# Patient Record
Sex: MALE | Race: WHITE | URBAN - METROPOLITAN AREA
[De-identification: names, ages, dates, MRNs, and addresses within clinical notes are randomized per-mention and may not be internally consistent; named-entity substitution may affect disease eponyms.]

---

## 2024-01-24 DIAGNOSIS — E78.5 HYPERLIPIDEMIA, UNSPECIFIED HYPERLIPIDEMIA TYPE: ICD-10-CM

## 2024-01-24 RX ORDER — LISINOPRIL 10 MG/1
10 TABLET ORAL DAILY
COMMUNITY
End: 2024-01-24 | Stop reason: SDUPTHER

## 2024-01-24 RX ORDER — LISINOPRIL 10 MG/1
10 TABLET ORAL DAILY
Qty: 90 TABLET | Refills: 3 | Status: SHIPPED | OUTPATIENT
Start: 2024-01-24

## 2024-02-08 ENCOUNTER — OFFICE (OUTPATIENT)
Dept: URBAN - METROPOLITAN AREA CLINIC 26 | Facility: CLINIC | Age: 79
End: 2024-02-08

## 2024-02-08 VITALS
HEART RATE: 73 BPM | DIASTOLIC BLOOD PRESSURE: 70 MMHG | HEIGHT: 73 IN | SYSTOLIC BLOOD PRESSURE: 132 MMHG | TEMPERATURE: 98.5 F | WEIGHT: 168 LBS

## 2024-02-08 DIAGNOSIS — Z09 ENCOUNTER FOR FOLLOW-UP EXAMINATION AFTER COMPLETED TREATMEN: ICD-10-CM

## 2024-02-08 DIAGNOSIS — Z86.010 PERSONAL HISTORY OF COLONIC POLYPS: ICD-10-CM

## 2024-02-08 DIAGNOSIS — K92.1 MELENA: ICD-10-CM

## 2024-02-08 DIAGNOSIS — Z80.0 FAMILY HISTORY OF MALIGNANT NEOPLASM OF DIGESTIVE ORGANS: ICD-10-CM

## 2024-02-08 PROCEDURE — 99204 OFFICE O/P NEW MOD 45 MIN: CPT | Performed by: INTERNAL MEDICINE

## 2024-02-22 PROBLEM — E11.9 DIABETES MELLITUS (MULTI): Status: ACTIVE | Noted: 2024-02-22

## 2024-02-22 PROBLEM — I63.9 STROKE (MULTI): Status: ACTIVE | Noted: 2024-02-22

## 2024-02-22 PROBLEM — Z95.5 STATUS POST INSERTION OF DRUG-ELUTING STENT INTO LEFT ANTERIOR DESCENDING (LAD) ARTERY: Status: ACTIVE | Noted: 2024-02-22

## 2024-02-22 PROBLEM — I10 BENIGN HYPERTENSION: Status: ACTIVE | Noted: 2024-02-22

## 2024-02-22 PROBLEM — M54.2 NECK PAIN, BILATERAL: Status: ACTIVE | Noted: 2024-02-22

## 2024-02-22 PROBLEM — I25.10 CAD (CORONARY ARTERY DISEASE): Status: ACTIVE | Noted: 2024-02-22

## 2024-02-22 PROBLEM — I25.2 OLD ANTERIOR MYOCARDIAL INFARCTION: Status: ACTIVE | Noted: 2024-02-22

## 2024-02-22 PROBLEM — E78.5 DYSLIPIDEMIA: Status: ACTIVE | Noted: 2024-02-22

## 2024-02-22 PROBLEM — I25.5 ISCHEMIC CARDIOMYOPATHY: Status: ACTIVE | Noted: 2024-02-22

## 2024-02-28 ENCOUNTER — OFFICE VISIT (OUTPATIENT)
Dept: CARDIOLOGY | Facility: CLINIC | Age: 79
End: 2024-02-28
Payer: MEDICARE

## 2024-02-28 VITALS
WEIGHT: 167 LBS | HEIGHT: 73 IN | SYSTOLIC BLOOD PRESSURE: 138 MMHG | HEART RATE: 71 BPM | DIASTOLIC BLOOD PRESSURE: 72 MMHG | BODY MASS INDEX: 22.13 KG/M2 | OXYGEN SATURATION: 98 %

## 2024-02-28 DIAGNOSIS — I25.2 OLD ANTERIOR MYOCARDIAL INFARCTION: ICD-10-CM

## 2024-02-28 DIAGNOSIS — I25.10 CORONARY ARTERY DISEASE INVOLVING NATIVE HEART, UNSPECIFIED VESSEL OR LESION TYPE, UNSPECIFIED WHETHER ANGINA PRESENT: Primary | ICD-10-CM

## 2024-02-28 DIAGNOSIS — I25.5 ISCHEMIC CARDIOMYOPATHY: ICD-10-CM

## 2024-02-28 DIAGNOSIS — E78.5 DYSLIPIDEMIA: ICD-10-CM

## 2024-02-28 DIAGNOSIS — Z95.5 STATUS POST INSERTION OF DRUG-ELUTING STENT INTO LEFT ANTERIOR DESCENDING (LAD) ARTERY: ICD-10-CM

## 2024-02-28 DIAGNOSIS — I10 BENIGN HYPERTENSION: ICD-10-CM

## 2024-02-28 PROCEDURE — 3075F SYST BP GE 130 - 139MM HG: CPT | Performed by: STUDENT IN AN ORGANIZED HEALTH CARE EDUCATION/TRAINING PROGRAM

## 2024-02-28 PROCEDURE — 1159F MED LIST DOCD IN RCRD: CPT | Performed by: STUDENT IN AN ORGANIZED HEALTH CARE EDUCATION/TRAINING PROGRAM

## 2024-02-28 PROCEDURE — 1036F TOBACCO NON-USER: CPT | Performed by: STUDENT IN AN ORGANIZED HEALTH CARE EDUCATION/TRAINING PROGRAM

## 2024-02-28 PROCEDURE — 1126F AMNT PAIN NOTED NONE PRSNT: CPT | Performed by: STUDENT IN AN ORGANIZED HEALTH CARE EDUCATION/TRAINING PROGRAM

## 2024-02-28 PROCEDURE — 3078F DIAST BP <80 MM HG: CPT | Performed by: STUDENT IN AN ORGANIZED HEALTH CARE EDUCATION/TRAINING PROGRAM

## 2024-02-28 PROCEDURE — 99213 OFFICE O/P EST LOW 20 MIN: CPT | Performed by: STUDENT IN AN ORGANIZED HEALTH CARE EDUCATION/TRAINING PROGRAM

## 2024-02-28 PROCEDURE — 93000 ELECTROCARDIOGRAM COMPLETE: CPT | Performed by: STUDENT IN AN ORGANIZED HEALTH CARE EDUCATION/TRAINING PROGRAM

## 2024-02-28 RX ORDER — TAMSULOSIN HYDROCHLORIDE 0.4 MG/1
0.4 CAPSULE ORAL DAILY
COMMUNITY

## 2024-02-28 RX ORDER — DULAGLUTIDE 1.5 MG/.5ML
1.5 INJECTION, SOLUTION SUBCUTANEOUS
COMMUNITY
Start: 2018-06-08

## 2024-02-28 RX ORDER — METOPROLOL SUCCINATE 25 MG/1
1 TABLET, EXTENDED RELEASE ORAL DAILY
COMMUNITY
End: 2024-04-12 | Stop reason: SDUPTHER

## 2024-02-28 RX ORDER — CLOPIDOGREL BISULFATE 75 MG/1
1 TABLET ORAL DAILY
COMMUNITY
End: 2024-04-12 | Stop reason: SDUPTHER

## 2024-02-28 RX ORDER — HYDROCODONE BITARTRATE AND ACETAMINOPHEN 5; 325 MG/1; MG/1
1 TABLET ORAL EVERY 4 HOURS PRN
COMMUNITY
Start: 2022-10-19

## 2024-02-28 RX ORDER — PANTOPRAZOLE SODIUM 40 MG/1
1 TABLET, DELAYED RELEASE ORAL DAILY
COMMUNITY

## 2024-02-28 RX ORDER — PRAVASTATIN SODIUM 20 MG/1
1 TABLET ORAL DAILY
COMMUNITY
Start: 2018-04-20

## 2024-02-28 RX ORDER — GLIMEPIRIDE 4 MG/1
4 TABLET ORAL 2 TIMES DAILY
COMMUNITY
Start: 2018-06-08

## 2024-02-28 RX ORDER — FINASTERIDE 5 MG/1
5 TABLET, FILM COATED ORAL DAILY
COMMUNITY
Start: 2023-12-21

## 2024-02-28 RX ORDER — NITROGLYCERIN 0.4 MG/1
0.4 TABLET SUBLINGUAL EVERY 5 MIN PRN
COMMUNITY
Start: 2022-08-10

## 2024-02-28 RX ORDER — METFORMIN HYDROCHLORIDE 1000 MG/1
1000 TABLET ORAL 2 TIMES DAILY
COMMUNITY
Start: 2018-06-08

## 2024-02-28 RX ORDER — ASPIRIN 81 MG/1
1 TABLET ORAL DAILY
COMMUNITY

## 2024-02-28 RX ORDER — POTASSIUM CITRATE 10 MEQ/1
10 TABLET, EXTENDED RELEASE ORAL
COMMUNITY
Start: 2024-02-06

## 2024-02-28 ASSESSMENT — PAIN SCALES - GENERAL: PAINLEVEL: 0-NO PAIN

## 2024-02-28 NOTE — PROGRESS NOTES
"Chief Complaint:   Yearly f/u with EKG today     History Of Present Illness:    Phill Baca is a 79 y.o. male returns for follow-up appointment.  Patient is doing well since his last appointment.  He has had changes in his stools he has a colonoscopy planned.  Otherwise has not had chest pain shortness of breath palpitations lower extreme edema orthopnea or PND.  Functional at baseline.  Baseline EKG shows sinus rhythm with PVCs.  Blood pressures acceptable 138/72 and heart rate of 71.     Last Recorded Vitals:  Vitals:    02/28/24 1505   BP: 138/72   BP Location: Right arm   Patient Position: Sitting   BP Cuff Size: Adult   Pulse: 71   SpO2: 98%   Weight: 75.8 kg (167 lb)   Height: 1.854 m (6' 1\")       Review of Systems  ROS      Allergies:  Allopurinol and Adhesive tape-silicones    Outpatient Medications:  Current Outpatient Medications   Medication Instructions    aspirin 81 mg EC tablet 1 tablet, oral, Daily    clopidogrel (Plavix) 75 mg tablet 1 tablet, oral, Daily    finasteride (PROSCAR) 5 mg, oral, Daily    glimepiride (AMARYL) 4 mg, oral, 2 times daily    HYDROcodone-acetaminophen (Norco) 5-325 mg tablet 1 tablet, oral, Every 4 hours PRN    lisinopril 10 mg, oral, Daily    metFORMIN (GLUCOPHAGE) 1,000 mg, oral, 2 times daily    metoprolol succinate XL (Toprol-XL) 25 mg 24 hr tablet 1 tablet, oral, Daily    nitroglycerin (NITROSTAT) 0.4 mg, sublingual, Every 5 min PRN    pantoprazole (ProtoNix) 40 mg EC tablet 1 tablet, oral, Daily    potassium citrate CR (Urocit-K-10) 10 mEq ER tablet 10 mEq, oral, 2 times daily with meals    pravastatin (Pravachol) 20 mg tablet 1 tablet, oral, Daily    tamsulosin (FLOMAX) 0.4 mg, oral, Daily    Trulicity 1.5 mg, subcutaneous, Weekly       Physical Exam:  General: No acute distress,  A&O x3, mild frailty  Skin: Warm and dry  Neck: JVD is not elevated  ENT: Moist mucous membranes no lesions appreciated  Pulmonary: CTAB  Cards: Regular rate rhythm, no murmurs gallops " "or rubs appreciated normal S1-S2  Abdomen: Soft nontender nondistended  Extremities: No edema or cyanosis  Psych: Appropriate mood and affect        Last Labs:  CBC -  Lab Results   Component Value Date    WBC 4.9 10/19/2022    HGB 9.3 (L) 10/19/2022    HCT 28.4 (L) 10/19/2022    MCV 82 10/19/2022     (L) 10/19/2022       CMP -  Lab Results   Component Value Date    CALCIUM 7.9 (L) 10/19/2022    PHOS 2.7 10/18/2022    PROT 5.2 (L) 10/19/2022    ALBUMIN 2.8 (L) 10/19/2022    AST 19 10/19/2022    ALT 10 10/19/2022    ALKPHOS 61 10/19/2022    BILITOT 0.5 10/19/2022       LIPID PANEL -   Lab Results   Component Value Date    CHOL 117 10/11/2018    TRIG 200 (H) 10/11/2018    HDL 39.3 (A) 10/11/2018    CHHDL 3.0 10/11/2018    LDLF 38 10/11/2018    VLDL 40 10/11/2018    NHDL 78 10/11/2018       RENAL FUNCTION PANEL -   Lab Results   Component Value Date    GLUCOSE 166 (H) 10/19/2022     10/19/2022    K 4.2 10/19/2022     10/19/2022    CO2 27 10/19/2022    ANIONGAP 11 10/19/2022    BUN 19 10/19/2022    CREATININE 1.15 10/19/2022    GFRMALE 65 10/19/2022    CALCIUM 7.9 (L) 10/19/2022    PHOS 2.7 10/18/2022    ALBUMIN 2.8 (L) 10/19/2022        Lab Results   Component Value Date    HGBA1C 7.4 10/11/2018       Last Cardiology Tests:  ECG:  No results found for this or any previous visit (from the past 4464 hour(s)).     Echo:  No results found for this or any previous visit from the past 1095 days.       Ejection Fractions:  No results found for: \"EF\"    Assessment and Plan    1. Ischemic heart disease: Status post anterior MI in 1998 status post bare-metal stent to the mid LAD and drug-eluting stent in 2015 for restenosis. EF is 50%. Asymptomatic. Currently on lisinopril 10 mg and metoprolol 25 mg daily. Currently on DAPT as well as statin and beta-blocker therapy. As needed nitroglycerin.    Undergoing colonoscopy for change in stools.  If notable pathology appreciated then will consider transitioning to " single antiplatelet therapy.     Baseline ECG notable for sinus rhythm with PVCs, left axis deviation and Q waves in inferior and anterolateral leads.     2. Hypertension: At goal today. Continue current medication regiment.     3. Hyperlipidemia: He is on pravastatin for lipid-lowering agents. His cholesterol was last checked by PCP.      (This note was generated with voice recognition software and may contain errors including spelling, grammar, syntax and missed recognition of what was dictated, of which may not have been fully corrected)     Wilbert Arauz MD PhD

## 2024-04-01 VITALS
HEART RATE: 78 BPM | DIASTOLIC BLOOD PRESSURE: 54 MMHG | SYSTOLIC BLOOD PRESSURE: 112 MMHG | DIASTOLIC BLOOD PRESSURE: 54 MMHG | HEART RATE: 71 BPM | OXYGEN SATURATION: 98 % | RESPIRATION RATE: 17 BRPM | SYSTOLIC BLOOD PRESSURE: 112 MMHG | HEART RATE: 63 BPM | WEIGHT: 169 LBS | DIASTOLIC BLOOD PRESSURE: 47 MMHG | SYSTOLIC BLOOD PRESSURE: 108 MMHG | DIASTOLIC BLOOD PRESSURE: 74 MMHG | SYSTOLIC BLOOD PRESSURE: 109 MMHG | SYSTOLIC BLOOD PRESSURE: 114 MMHG | SYSTOLIC BLOOD PRESSURE: 97 MMHG | SYSTOLIC BLOOD PRESSURE: 119 MMHG | HEART RATE: 75 BPM | SYSTOLIC BLOOD PRESSURE: 149 MMHG | DIASTOLIC BLOOD PRESSURE: 51 MMHG | SYSTOLIC BLOOD PRESSURE: 132 MMHG | RESPIRATION RATE: 15 BRPM | HEART RATE: 71 BPM | SYSTOLIC BLOOD PRESSURE: 124 MMHG | RESPIRATION RATE: 18 BRPM | DIASTOLIC BLOOD PRESSURE: 53 MMHG | SYSTOLIC BLOOD PRESSURE: 124 MMHG | HEART RATE: 75 BPM | RESPIRATION RATE: 19 BRPM | RESPIRATION RATE: 14 BRPM | TEMPERATURE: 97.2 F | HEART RATE: 83 BPM | HEART RATE: 67 BPM | DIASTOLIC BLOOD PRESSURE: 49 MMHG | HEIGHT: 73 IN | SYSTOLIC BLOOD PRESSURE: 121 MMHG | DIASTOLIC BLOOD PRESSURE: 48 MMHG | RESPIRATION RATE: 15 BRPM | RESPIRATION RATE: 12 BRPM | SYSTOLIC BLOOD PRESSURE: 109 MMHG | RESPIRATION RATE: 16 BRPM | SYSTOLIC BLOOD PRESSURE: 108 MMHG | RESPIRATION RATE: 9 BRPM | HEART RATE: 67 BPM | HEART RATE: 63 BPM | HEART RATE: 83 BPM | DIASTOLIC BLOOD PRESSURE: 64 MMHG | OXYGEN SATURATION: 98 % | WEIGHT: 169 LBS | HEART RATE: 68 BPM | OXYGEN SATURATION: 95 % | HEART RATE: 76 BPM | DIASTOLIC BLOOD PRESSURE: 64 MMHG | HEART RATE: 70 BPM | HEART RATE: 76 BPM | SYSTOLIC BLOOD PRESSURE: 97 MMHG | HEART RATE: 60 BPM | RESPIRATION RATE: 14 BRPM | DIASTOLIC BLOOD PRESSURE: 55 MMHG | HEART RATE: 63 BPM | DIASTOLIC BLOOD PRESSURE: 45 MMHG | SYSTOLIC BLOOD PRESSURE: 121 MMHG | RESPIRATION RATE: 16 BRPM | SYSTOLIC BLOOD PRESSURE: 109 MMHG | DIASTOLIC BLOOD PRESSURE: 47 MMHG | SYSTOLIC BLOOD PRESSURE: 120 MMHG | SYSTOLIC BLOOD PRESSURE: 149 MMHG | RESPIRATION RATE: 10 BRPM | RESPIRATION RATE: 17 BRPM | HEART RATE: 68 BPM | RESPIRATION RATE: 17 BRPM | SYSTOLIC BLOOD PRESSURE: 119 MMHG | RESPIRATION RATE: 14 BRPM | HEART RATE: 67 BPM | HEART RATE: 60 BPM | SYSTOLIC BLOOD PRESSURE: 124 MMHG | OXYGEN SATURATION: 95 % | HEIGHT: 73 IN | DIASTOLIC BLOOD PRESSURE: 55 MMHG | SYSTOLIC BLOOD PRESSURE: 119 MMHG | OXYGEN SATURATION: 96 % | DIASTOLIC BLOOD PRESSURE: 47 MMHG | SYSTOLIC BLOOD PRESSURE: 115 MMHG | HEART RATE: 74 BPM | HEART RATE: 76 BPM | SYSTOLIC BLOOD PRESSURE: 115 MMHG | OXYGEN SATURATION: 98 % | SYSTOLIC BLOOD PRESSURE: 123 MMHG | SYSTOLIC BLOOD PRESSURE: 120 MMHG | SYSTOLIC BLOOD PRESSURE: 108 MMHG | HEART RATE: 74 BPM | RESPIRATION RATE: 15 BRPM | RESPIRATION RATE: 9 BRPM | SYSTOLIC BLOOD PRESSURE: 112 MMHG | OXYGEN SATURATION: 97 % | OXYGEN SATURATION: 96 % | WEIGHT: 169 LBS | TEMPERATURE: 97.2 F | DIASTOLIC BLOOD PRESSURE: 51 MMHG | OXYGEN SATURATION: 95 % | OXYGEN SATURATION: 97 % | RESPIRATION RATE: 16 BRPM | DIASTOLIC BLOOD PRESSURE: 51 MMHG | SYSTOLIC BLOOD PRESSURE: 121 MMHG | SYSTOLIC BLOOD PRESSURE: 120 MMHG | DIASTOLIC BLOOD PRESSURE: 49 MMHG | DIASTOLIC BLOOD PRESSURE: 45 MMHG | SYSTOLIC BLOOD PRESSURE: 123 MMHG | SYSTOLIC BLOOD PRESSURE: 111 MMHG | HEART RATE: 75 BPM | HEART RATE: 70 BPM | HEART RATE: 60 BPM | SYSTOLIC BLOOD PRESSURE: 123 MMHG | DIASTOLIC BLOOD PRESSURE: 49 MMHG | SYSTOLIC BLOOD PRESSURE: 114 MMHG | HEART RATE: 78 BPM | RESPIRATION RATE: 10 BRPM | DIASTOLIC BLOOD PRESSURE: 48 MMHG | SYSTOLIC BLOOD PRESSURE: 115 MMHG | SYSTOLIC BLOOD PRESSURE: 132 MMHG | RESPIRATION RATE: 19 BRPM | SYSTOLIC BLOOD PRESSURE: 114 MMHG | DIASTOLIC BLOOD PRESSURE: 53 MMHG | DIASTOLIC BLOOD PRESSURE: 53 MMHG | RESPIRATION RATE: 12 BRPM | HEART RATE: 74 BPM | SYSTOLIC BLOOD PRESSURE: 149 MMHG | RESPIRATION RATE: 10 BRPM | DIASTOLIC BLOOD PRESSURE: 54 MMHG | OXYGEN SATURATION: 96 % | RESPIRATION RATE: 13 BRPM | DIASTOLIC BLOOD PRESSURE: 45 MMHG | OXYGEN SATURATION: 97 % | HEART RATE: 78 BPM | SYSTOLIC BLOOD PRESSURE: 97 MMHG | SYSTOLIC BLOOD PRESSURE: 111 MMHG | RESPIRATION RATE: 18 BRPM | HEART RATE: 68 BPM | DIASTOLIC BLOOD PRESSURE: 64 MMHG | RESPIRATION RATE: 9 BRPM | HEART RATE: 71 BPM | RESPIRATION RATE: 13 BRPM | SYSTOLIC BLOOD PRESSURE: 111 MMHG | DIASTOLIC BLOOD PRESSURE: 55 MMHG | RESPIRATION RATE: 13 BRPM | RESPIRATION RATE: 18 BRPM | DIASTOLIC BLOOD PRESSURE: 74 MMHG | TEMPERATURE: 97.2 F | RESPIRATION RATE: 19 BRPM | DIASTOLIC BLOOD PRESSURE: 48 MMHG | SYSTOLIC BLOOD PRESSURE: 132 MMHG | HEART RATE: 83 BPM | DIASTOLIC BLOOD PRESSURE: 74 MMHG | RESPIRATION RATE: 12 BRPM | HEIGHT: 73 IN | HEART RATE: 70 BPM

## 2024-04-08 ENCOUNTER — OFFICE (OUTPATIENT)
Dept: URBAN - METROPOLITAN AREA PATHOLOGY 2 | Facility: PATHOLOGY | Age: 79
End: 2024-04-08
Payer: MEDICARE

## 2024-04-08 ENCOUNTER — AMBULATORY SURGICAL CENTER (OUTPATIENT)
Dept: URBAN - METROPOLITAN AREA SURGERY 12 | Facility: SURGERY | Age: 79
End: 2024-04-08
Payer: MEDICARE

## 2024-04-08 DIAGNOSIS — K44.9 DIAPHRAGMATIC HERNIA WITHOUT OBSTRUCTION OR GANGRENE: ICD-10-CM

## 2024-04-08 DIAGNOSIS — Z86.010 PERSONAL HISTORY OF COLONIC POLYPS: ICD-10-CM

## 2024-04-08 DIAGNOSIS — Z09 ENCOUNTER FOR FOLLOW-UP EXAMINATION AFTER COMPLETED TREATMEN: ICD-10-CM

## 2024-04-08 DIAGNOSIS — K92.1 MELENA: ICD-10-CM

## 2024-04-08 DIAGNOSIS — K29.80 DUODENITIS WITHOUT BLEEDING: ICD-10-CM

## 2024-04-08 DIAGNOSIS — K31.89 OTHER DISEASES OF STOMACH AND DUODENUM: ICD-10-CM

## 2024-04-08 DIAGNOSIS — K22.70 BARRETT'S ESOPHAGUS WITHOUT DYSPLASIA: ICD-10-CM

## 2024-04-08 DIAGNOSIS — K22.89 OTHER SPECIFIED DISEASE OF ESOPHAGUS: ICD-10-CM

## 2024-04-08 DIAGNOSIS — K29.50 UNSPECIFIED CHRONIC GASTRITIS WITHOUT BLEEDING: ICD-10-CM

## 2024-04-08 DIAGNOSIS — K21.9 GASTRO-ESOPHAGEAL REFLUX DISEASE WITHOUT ESOPHAGITIS: ICD-10-CM

## 2024-04-08 DIAGNOSIS — K64.8 OTHER HEMORRHOIDS: ICD-10-CM

## 2024-04-08 DIAGNOSIS — D12.5 BENIGN NEOPLASM OF SIGMOID COLON: ICD-10-CM

## 2024-04-08 DIAGNOSIS — K63.5 POLYP OF COLON: ICD-10-CM

## 2024-04-08 PROCEDURE — 88305 TISSUE EXAM BY PATHOLOGIST: CPT | Mod: TC | Performed by: PATHOLOGY

## 2024-04-08 PROCEDURE — 45385 COLONOSCOPY W/LESION REMOVAL: CPT | Performed by: INTERNAL MEDICINE

## 2024-04-08 PROCEDURE — 43239 EGD BIOPSY SINGLE/MULTIPLE: CPT | Mod: 51 | Performed by: INTERNAL MEDICINE

## 2024-04-08 PROCEDURE — 88342 IMHCHEM/IMCYTCHM 1ST ANTB: CPT | Mod: TC | Performed by: PATHOLOGY

## 2024-04-08 PROCEDURE — 88313 SPECIAL STAINS GROUP 2: CPT | Mod: TC | Performed by: PATHOLOGY

## 2024-04-08 PROCEDURE — 88341 IMHCHEM/IMCYTCHM EA ADD ANTB: CPT | Mod: TC | Performed by: PATHOLOGY

## 2024-04-12 DIAGNOSIS — I10 BENIGN HYPERTENSION: ICD-10-CM

## 2024-04-12 DIAGNOSIS — I25.2 OLD ANTERIOR MYOCARDIAL INFARCTION: ICD-10-CM

## 2024-04-15 RX ORDER — CLOPIDOGREL BISULFATE 75 MG/1
75 TABLET ORAL DAILY
Qty: 90 TABLET | Refills: 3 | Status: SHIPPED | OUTPATIENT
Start: 2024-04-15 | End: 2025-04-15

## 2024-04-15 RX ORDER — METOPROLOL SUCCINATE 25 MG/1
25 TABLET, EXTENDED RELEASE ORAL DAILY
Qty: 90 TABLET | Refills: 3 | Status: SHIPPED | OUTPATIENT
Start: 2024-04-15 | End: 2025-04-15

## 2024-11-21 ENCOUNTER — NURSING HOME VISIT (OUTPATIENT)
Dept: POST ACUTE CARE | Facility: EXTERNAL LOCATION | Age: 79
End: 2024-11-21
Payer: MEDICARE

## 2024-11-21 DIAGNOSIS — Z79.4 TYPE 2 DIABETES MELLITUS WITHOUT COMPLICATION, WITH LONG-TERM CURRENT USE OF INSULIN (MULTI): ICD-10-CM

## 2024-11-21 DIAGNOSIS — E11.9 TYPE 2 DIABETES MELLITUS WITHOUT COMPLICATION, WITH LONG-TERM CURRENT USE OF INSULIN (MULTI): ICD-10-CM

## 2024-11-21 DIAGNOSIS — R13.10 DYSPHAGIA, UNSPECIFIED TYPE: ICD-10-CM

## 2024-11-21 DIAGNOSIS — K21.9 GASTROESOPHAGEAL REFLUX DISEASE, UNSPECIFIED WHETHER ESOPHAGITIS PRESENT: ICD-10-CM

## 2024-11-21 DIAGNOSIS — N40.0 BENIGN PROSTATIC HYPERPLASIA, UNSPECIFIED WHETHER LOWER URINARY TRACT SYMPTOMS PRESENT: ICD-10-CM

## 2024-11-21 DIAGNOSIS — I10 BENIGN HYPERTENSION: ICD-10-CM

## 2024-11-21 DIAGNOSIS — I63.9 CEREBRAL INFARCTION, UNSPECIFIED MECHANISM (MULTI): Primary | ICD-10-CM

## 2024-11-21 DIAGNOSIS — R33.9 URINARY RETENTION: ICD-10-CM

## 2024-11-21 NOTE — LETTER
Patient: Phill Baca  : 1945    Encounter Date: 2024    HISTORY & PHYSICAL    Subjective  Chief complaint: Phill Baca is a 79 y.o. male who is being seen and evaluated for multiple medical problems.  Patient admitted to SNF for therapy due to weakness after recent hospitalization.    HPI:  HPI  Patient is a 79-year-old male presenting for admission to nursing facility following hospitalization.  Patient presented to the hospital and treated for cerebral infarction.  Patient does have PEG in place, tolerating enteral feed due to dysphagia.  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  Patient was evaluated by PT OT with recommendations for further therapy at SNF.  Patient was hemodynamically stable to discharge to SNF for continued medical management and further therapy.  Patient seen and examined at the bedside, appears to be in no acute distress.  Denies chest pain, shortness of breath, nausea vomiting fever chills.  Past Medical History:   Diagnosis Date   • BPH (benign prostatic hyperplasia)    • Cerebral infarction    • Constipation    • Diabetes mellitus (Multi)    • Dysphagia    • GERD (gastroesophageal reflux disease)    • Hypertension        Past Surgical History:   Procedure Laterality Date   • OTHER SURGICAL HISTORY  2022    Cardiac catheterization with stent placement       Family History   Problem Relation Name Age of Onset   • No Known Problems Mother     • No Known Problems Father         Social History     Socioeconomic History   • Marital status:    Tobacco Use   • Smoking status: Never   • Smokeless tobacco: Never       Vital signs: 123/78, 80, 18, 97.5, 98%    Objective  Physical Exam  Constitutional:       General: He is not in acute distress.  Eyes:      Extraocular Movements: Extraocular movements intact.   Cardiovascular:      Rate and Rhythm: Normal rate and regular  rhythm.   Pulmonary:      Effort: Pulmonary effort is normal.      Breath sounds: Normal breath sounds.   Abdominal:      General: Bowel sounds are normal.      Palpations: Abdomen is soft.      Comments: PEG   Genitourinary:     Comments: Graves  Musculoskeletal:      Cervical back: Neck supple.      Right lower leg: No edema.      Left lower leg: No edema.   Neurological:      Mental Status: He is alert.   Psychiatric:         Mood and Affect: Mood normal.         Behavior: Behavior is cooperative.         Assessment/Plan  Problem List Items Addressed This Visit       Benign hypertension     Monitor blood pressure  Lisinopril  Metoprolol         Diabetes mellitus (Multi)     Metformin  Glimepiride  Insulin glargine  Sliding scale insulin  Glucoscan         Cerebral infarction - Primary     Monitor for changes in speech, mentation, or weakness.  Statin  Aspirin         BPH (benign prostatic hyperplasia)     Monitor  symptoms.  Finasteride  Doxazosin         GERD (gastroesophageal reflux disease)     Monitor GI symptoms  Famotidine         Urinary retention     Graves  Catheter care         Dysphagia     N.p.o.  PEG placement  Enteral feed          Hospital records reviewed  Medications, treatments, and labs reviewed  Continue medications and treatments as listed in EMR  Discussed with nursing and therapy      Scribe Attestation  ICeli Scribe   attest that this documentation has been prepared under the direction and in the presence of Latesha Nathan MD    Provider Attestation - Scribe documentation  All medical record entries made by the Scribe were at my direction and personally dictated by me. I have reviewed the chart and agree that the record accurately reflects my personal performance of the history, physical exam, discussion and plan.   Latesha Nathan MD      Electronically Signed By: Latesha Nathan MD   11/22/24  3:06 PM

## 2024-11-21 NOTE — PROGRESS NOTES
HISTORY & PHYSICAL    Subjective   Chief complaint: Phill Baca is a 79 y.o. male who is being seen and evaluated for multiple medical problems.  Patient admitted to SNF for therapy due to weakness after recent hospitalization.    HPI:  HPI  Patient is a 79-year-old male presenting for admission to nursing facility following hospitalization.  Patient presented to the hospital and treated for cerebral infarction.  Patient does have PEG in place, tolerating enteral feed due to dysphagia.  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  Patient was evaluated by PT OT with recommendations for further therapy at SNF.  Patient was hemodynamically stable to discharge to SNF for continued medical management and further therapy.  Patient seen and examined at the bedside, appears to be in no acute distress.  Denies chest pain, shortness of breath, nausea vomiting fever chills.  Past Medical History:   Diagnosis Date    BPH (benign prostatic hyperplasia)     Cerebral infarction     Constipation     Diabetes mellitus (Multi)     Dysphagia     GERD (gastroesophageal reflux disease)     Hypertension        Past Surgical History:   Procedure Laterality Date    OTHER SURGICAL HISTORY  08/09/2022    Cardiac catheterization with stent placement       Family History   Problem Relation Name Age of Onset    No Known Problems Mother      No Known Problems Father         Social History     Socioeconomic History    Marital status:    Tobacco Use    Smoking status: Never    Smokeless tobacco: Never       Vital signs: 123/78, 80, 18, 97.5, 98%    Objective   Physical Exam  Constitutional:       General: He is not in acute distress.  Eyes:      Extraocular Movements: Extraocular movements intact.   Cardiovascular:      Rate and Rhythm: Normal rate and regular rhythm.   Pulmonary:      Effort: Pulmonary effort is normal.      Breath sounds:  Normal breath sounds.   Abdominal:      General: Bowel sounds are normal.      Palpations: Abdomen is soft.      Comments: PEG   Genitourinary:     Comments: Graves  Musculoskeletal:      Cervical back: Neck supple.      Right lower leg: No edema.      Left lower leg: No edema.   Neurological:      Mental Status: He is alert.   Psychiatric:         Mood and Affect: Mood normal.         Behavior: Behavior is cooperative.         Assessment/Plan   Problem List Items Addressed This Visit       Benign hypertension     Monitor blood pressure  Lisinopril  Metoprolol         Diabetes mellitus (Multi)     Metformin  Glimepiride  Insulin glargine  Sliding scale insulin  Glucoscan         Cerebral infarction - Primary     Monitor for changes in speech, mentation, or weakness.  Statin  Aspirin         BPH (benign prostatic hyperplasia)     Monitor  symptoms.  Finasteride  Doxazosin         GERD (gastroesophageal reflux disease)     Monitor GI symptoms  Famotidine         Urinary retention     Graves  Catheter care         Dysphagia     N.p.o.  PEG placement  Enteral feed          Hospital records reviewed  Medications, treatments, and labs reviewed  Continue medications and treatments as listed in EMR  Discussed with nursing and therapy      Scribe Attestation  Celi JAMES Scribe   attest that this documentation has been prepared under the direction and in the presence of Latesha Nathan MD    Provider Attestation - Scribe documentation  All medical record entries made by the Scribe were at my direction and personally dictated by me. I have reviewed the chart and agree that the record accurately reflects my personal performance of the history, physical exam, discussion and plan.   Latesha Nathan MD

## 2024-11-22 ENCOUNTER — NURSING HOME VISIT (OUTPATIENT)
Dept: POST ACUTE CARE | Facility: EXTERNAL LOCATION | Age: 79
End: 2024-11-22
Payer: MEDICARE

## 2024-11-22 DIAGNOSIS — I10 BENIGN HYPERTENSION: ICD-10-CM

## 2024-11-22 DIAGNOSIS — E11.9 TYPE 2 DIABETES MELLITUS WITHOUT COMPLICATION, WITH LONG-TERM CURRENT USE OF INSULIN (MULTI): ICD-10-CM

## 2024-11-22 DIAGNOSIS — Z79.4 TYPE 2 DIABETES MELLITUS WITHOUT COMPLICATION, WITH LONG-TERM CURRENT USE OF INSULIN (MULTI): ICD-10-CM

## 2024-11-22 DIAGNOSIS — R53.1 WEAKNESS: Primary | ICD-10-CM

## 2024-11-22 DIAGNOSIS — R33.9 URINARY RETENTION: ICD-10-CM

## 2024-11-22 PROCEDURE — 99308 SBSQ NF CARE LOW MDM 20: CPT | Performed by: REGISTERED NURSE

## 2024-11-22 NOTE — LETTER
Patient: Phill Baca  : 1945    Encounter Date: 2024    PROGRESS NOTE    Subjective  Chief complaint: Phill Baca is a 79 y.o. male who is an acute skilled patient being seen and evaluated for weakness    HPI:  Patient seen and examined at bedside. Patient denies n/v/f/c. Continues working in therapy. No new complaints    Objective  Vital signs: 126/78, 97.8, 88, 14, 97%    Physical Exam  Constitutional:       General: He is not in acute distress.  Eyes:      Extraocular Movements: Extraocular movements intact.   Pulmonary:      Effort: Pulmonary effort is normal.   Musculoskeletal:      Cervical back: Neck supple.   Neurological:      Mental Status: He is alert.   Psychiatric:         Mood and Affect: Mood normal.         Behavior: Behavior is cooperative.         Assessment/Plan  Problem List Items Addressed This Visit       Benign hypertension     Monitor blood pressure  Lisinopril  Metoprolol         Diabetes mellitus (Multi)     Metformin  Glimepiride  Insulin glargine  Sliding scale insulin  Glucoscan            Urinary retention     Graves  Catheter care         Weakness - Primary     Completed therapy          Medications, treatments, and labs reviewed  Continue medications and treatments as listed in [facility]    Scribe Attestation  I, Sonia Bennett   attest that this documentation has been prepared under the direction and in the presence of OSMIN Sanchez.    Provider Attestation - Scribe documentation  All medical record entries made by the Scribe were at my direction and personally dictated by me. I have reviewed the chart and agree that the record accurately reflects my personal performance of the history, physical exam, discussion and plan.    OSMIN Sanchez        Electronically Signed By: OSMIN Sanchez   24  5:26 PM

## 2024-11-25 ENCOUNTER — NURSING HOME VISIT (OUTPATIENT)
Dept: POST ACUTE CARE | Facility: EXTERNAL LOCATION | Age: 79
End: 2024-11-25
Payer: MEDICARE

## 2024-11-25 DIAGNOSIS — R53.1 WEAKNESS: ICD-10-CM

## 2024-11-25 DIAGNOSIS — I63.9 CEREBRAL INFARCTION, UNSPECIFIED MECHANISM (MULTI): ICD-10-CM

## 2024-11-25 DIAGNOSIS — R52 PAIN: ICD-10-CM

## 2024-11-25 DIAGNOSIS — I10 BENIGN HYPERTENSION: ICD-10-CM

## 2024-11-25 DIAGNOSIS — R13.10 DYSPHAGIA, UNSPECIFIED TYPE: ICD-10-CM

## 2024-11-25 DIAGNOSIS — R33.9 URINARY RETENTION: ICD-10-CM

## 2024-11-25 DIAGNOSIS — B37.0 THRUSH: Primary | ICD-10-CM

## 2024-11-25 PROCEDURE — 99309 SBSQ NF CARE MODERATE MDM 30: CPT | Performed by: INTERNAL MEDICINE

## 2024-11-25 NOTE — LETTER
Patient: Phill Baca  : 1945    Encounter Date: 2024    PROGRESS NOTE    Subjective  Chief complaint: Phill Baca is a 79 y.o. male who is an acute skilled patient being seen and evaluated for weakness    HPI:  HPI  Patient presents for f/u therapy and general medical care.  Patient seen and examined at bedside.  Therapy has been working with the patient to improve strength, endurance, ADLs, and transfers d/t generalized weakness.  Patient noted with white coating to tongue, thrush.  Orders placed.  Start nystatin.  Patient does have complaints of pain and does have Tylenol currently ordered.  Nursing to administer.  Continue to monitor    Objective  Vital signs: 112/66, 95, 16, 97.0, 99% blood sugar 260    Physical Exam  Constitutional:       General: He is not in acute distress.  Eyes:      Extraocular Movements: Extraocular movements intact.   Cardiovascular:      Rate and Rhythm: Normal rate and regular rhythm.   Pulmonary:      Effort: Pulmonary effort is normal.      Breath sounds: Normal breath sounds.   Abdominal:      General: Bowel sounds are normal.      Palpations: Abdomen is soft.      Comments: PEG   Genitourinary:     Comments: Graves  Musculoskeletal:      Cervical back: Neck supple.      Right lower leg: No edema.      Left lower leg: No edema.   Neurological:      Mental Status: He is alert.      Motor: Weakness present.   Psychiatric:         Mood and Affect: Mood normal.         Behavior: Behavior is cooperative.         Assessment/Plan  Problem List Items Addressed This Visit       Benign hypertension     Monitor blood pressure  Lisinopril  Metoprolol         Cerebral infarction     Monitor for changes in speech, mentation, or weakness.  Statin  Aspirin         Urinary retention     Graves  Catheter care         Dysphagia     N.p.o.  PEG placement  Enteral feed         Thrush - Primary     Start nystatin         Weakness     Continue therapy         Pain     Tylenol as  needed          Medications, treatments, and labs reviewed  Continue medications and treatments as listed in EMR      Scribe Attestation  I, Sonia Preciado   attest that this documentation has been prepared under the direction and in the presence of Latesha Nathan MD    Provider Attestation - Scribe documentation  All medical record entries made by the Scribe were at my direction and personally dictated by me. I have reviewed the chart and agree that the record accurately reflects my personal performance of the history, physical exam, discussion and plan.   Latesha Nathan MD        Electronically Signed By: Latesha Nathan MD   11/26/24  2:15 PM

## 2024-11-25 NOTE — PROGRESS NOTES
PROGRESS NOTE    Subjective   Chief complaint: Phill Baca is a 79 y.o. male who is an acute skilled patient being seen and evaluated for weakness    HPI:  HPI  Patient presents for f/u therapy and general medical care.  Patient seen and examined at bedside.  Therapy has been working with the patient to improve strength, endurance, ADLs, and transfers d/t generalized weakness.  Patient noted with white coating to tongue, thrush.  Orders placed.  Start nystatin.  Patient does have complaints of pain and does have Tylenol currently ordered.  Nursing to administer.  Continue to monitor    Objective   Vital signs: 112/66, 95, 16, 97.0, 99% blood sugar 260    Physical Exam  Constitutional:       General: He is not in acute distress.  Eyes:      Extraocular Movements: Extraocular movements intact.   Cardiovascular:      Rate and Rhythm: Normal rate and regular rhythm.   Pulmonary:      Effort: Pulmonary effort is normal.      Breath sounds: Normal breath sounds.   Abdominal:      General: Bowel sounds are normal.      Palpations: Abdomen is soft.      Comments: PEG   Genitourinary:     Comments: Graves  Musculoskeletal:      Cervical back: Neck supple.      Right lower leg: No edema.      Left lower leg: No edema.   Neurological:      Mental Status: He is alert.      Motor: Weakness present.   Psychiatric:         Mood and Affect: Mood normal.         Behavior: Behavior is cooperative.         Assessment/Plan   Problem List Items Addressed This Visit       Benign hypertension     Monitor blood pressure  Lisinopril  Metoprolol         Cerebral infarction     Monitor for changes in speech, mentation, or weakness.  Statin  Aspirin         Urinary retention     Graves  Catheter care         Dysphagia     N.p.o.  PEG placement  Enteral feed         Thrush - Primary     Start nystatin         Weakness     Continue therapy         Pain     Tylenol as needed          Medications, treatments, and labs reviewed  Continue  medications and treatments as listed in EMR      Scribe Attestation  I, Sonia Preciado   attest that this documentation has been prepared under the direction and in the presence of Latesha Nathan MD    Provider Attestation - Scribe documentation  All medical record entries made by the Scribe were at my direction and personally dictated by me. I have reviewed the chart and agree that the record accurately reflects my personal performance of the history, physical exam, discussion and plan.   Latesha Nathan MD

## 2024-11-26 ENCOUNTER — NURSING HOME VISIT (OUTPATIENT)
Dept: POST ACUTE CARE | Facility: EXTERNAL LOCATION | Age: 79
End: 2024-11-26
Payer: MEDICARE

## 2024-11-26 DIAGNOSIS — I63.9 CEREBRAL INFARCTION, UNSPECIFIED MECHANISM (MULTI): Primary | ICD-10-CM

## 2024-11-26 DIAGNOSIS — B37.0 THRUSH: ICD-10-CM

## 2024-11-26 DIAGNOSIS — E11.9 TYPE 2 DIABETES MELLITUS WITHOUT COMPLICATION, WITH LONG-TERM CURRENT USE OF INSULIN (MULTI): ICD-10-CM

## 2024-11-26 DIAGNOSIS — Z79.4 TYPE 2 DIABETES MELLITUS WITHOUT COMPLICATION, WITH LONG-TERM CURRENT USE OF INSULIN (MULTI): ICD-10-CM

## 2024-11-26 DIAGNOSIS — R52 PAIN: ICD-10-CM

## 2024-11-26 DIAGNOSIS — R53.1 WEAKNESS: ICD-10-CM

## 2024-11-26 DIAGNOSIS — K21.9 GASTROESOPHAGEAL REFLUX DISEASE, UNSPECIFIED WHETHER ESOPHAGITIS PRESENT: ICD-10-CM

## 2024-11-26 PROCEDURE — 99309 SBSQ NF CARE MODERATE MDM 30: CPT | Performed by: INTERNAL MEDICINE

## 2024-11-26 NOTE — LETTER
Patient: Phill Baca  : 1945    Encounter Date: 2024    PROGRESS NOTE    Subjective  Chief complaint: Phill Baca is a 79 y.o. male who is an acute skilled patient being seen and evaluated for weakness    HPI:  HPI  Patient is currently working with therapy.  Therapy has been working with the patient to improve strength and endurance with ADLs, transfers, and mobility.  Patient continues to work toward goals.  Patient is stable and has no new complaints.  Nursing staff voices no new concerns today.  Patient was seen and examined at the bedside, appears to be in no acute distress.  Patient does continue on nystatin for thrush.  Patient also with pain from contractures from CVA and patient having Tylenol scheduled.    Objective  Vital signs: 126/78, 88, 14, 97.8, 97% blood sugar 267    Physical Exam  Constitutional:       General: He is not in acute distress.  Eyes:      Extraocular Movements: Extraocular movements intact.   Cardiovascular:      Rate and Rhythm: Normal rate and regular rhythm.   Pulmonary:      Effort: Pulmonary effort is normal.      Breath sounds: Normal breath sounds.   Abdominal:      General: Bowel sounds are normal.      Palpations: Abdomen is soft.      Comments: PEG   Genitourinary:     Comments: Graves  Musculoskeletal:      Cervical back: Neck supple.      Right lower leg: No edema.      Left lower leg: No edema.   Neurological:      Mental Status: He is alert.      Motor: Weakness present.   Psychiatric:         Mood and Affect: Mood normal.         Behavior: Behavior is cooperative.         Assessment/Plan  Problem List Items Addressed This Visit       Diabetes mellitus (Multi)     Metformin  Glimepiride  Insulin glargine  Sliding scale insulin  Glucoscan         Cerebral infarction - Primary     Monitor for changes in speech, mentation, or weakness.  Statin  Aspirin         GERD (gastroesophageal reflux disease)     Monitor GI symptoms  Famotidine         Thrush      Continue nystatin         Weakness     Work in therapy towards goals         Pain     Continue Tylenol scheduled          Medications, treatments, and labs reviewed  Continue medications and treatments as listed in EMR      Scribe Attestation  I, Sonia Preciado   attest that this documentation has been prepared under the direction and in the presence of Latesha Nathan MD    Provider Attestation - Scribe documentation  All medical record entries made by the Scribe were at my direction and personally dictated by me. I have reviewed the chart and agree that the record accurately reflects my personal performance of the history, physical exam, discussion and plan.   Latesha Nathan MD        Electronically Signed By: Latesha Nathan MD   11/27/24  2:57 PM

## 2024-11-26 NOTE — PROGRESS NOTES
PROGRESS NOTE    Subjective   Chief complaint: Phill Baca is a 79 y.o. male who is an acute skilled patient being seen and evaluated for weakness    HPI:  Patient seen and examined at bedside. Patient denies n/v/f/c. Continues working in therapy. No new complaints    Objective   Vital signs: 126/78, 97.8, 88, 14, 97%    Physical Exam  Constitutional:       General: He is not in acute distress.  Eyes:      Extraocular Movements: Extraocular movements intact.   Pulmonary:      Effort: Pulmonary effort is normal.   Musculoskeletal:      Cervical back: Neck supple.   Neurological:      Mental Status: He is alert.   Psychiatric:         Mood and Affect: Mood normal.         Behavior: Behavior is cooperative.         Assessment/Plan   Problem List Items Addressed This Visit       Benign hypertension     Monitor blood pressure  Lisinopril  Metoprolol         Diabetes mellitus (Multi)     Metformin  Glimepiride  Insulin glargine  Sliding scale insulin  Glucoscan            Urinary retention     Graves  Catheter care         Weakness - Primary     Completed therapy          Medications, treatments, and labs reviewed  Continue medications and treatments as listed in [facility]    Scribe Attestation  IBlanche Scribe   attest that this documentation has been prepared under the direction and in the presence of OSMIN Sanchez.    Provider Attestation - Scribe documentation  All medical record entries made by the Scribe were at my direction and personally dictated by me. I have reviewed the chart and agree that the record accurately reflects my personal performance of the history, physical exam, discussion and plan.    OSMIN Sanchez

## 2024-11-26 NOTE — PROGRESS NOTES
PROGRESS NOTE    Subjective   Chief complaint: Phill Baca is a 79 y.o. male who is an acute skilled patient being seen and evaluated for weakness    HPI:  HPI  Patient is currently working with therapy.  Therapy has been working with the patient to improve strength and endurance with ADLs, transfers, and mobility.  Patient continues to work toward goals.  Patient is stable and has no new complaints.  Nursing staff voices no new concerns today.  Patient was seen and examined at the bedside, appears to be in no acute distress.  Patient does continue on nystatin for thrush.  Patient also with pain from contractures from CVA and patient having Tylenol scheduled.    Objective   Vital signs: 126/78, 88, 14, 97.8, 97% blood sugar 267    Physical Exam  Constitutional:       General: He is not in acute distress.  Eyes:      Extraocular Movements: Extraocular movements intact.   Cardiovascular:      Rate and Rhythm: Normal rate and regular rhythm.   Pulmonary:      Effort: Pulmonary effort is normal.      Breath sounds: Normal breath sounds.   Abdominal:      General: Bowel sounds are normal.      Palpations: Abdomen is soft.      Comments: PEG   Genitourinary:     Comments: Graves  Musculoskeletal:      Cervical back: Neck supple.      Right lower leg: No edema.      Left lower leg: No edema.   Neurological:      Mental Status: He is alert.      Motor: Weakness present.   Psychiatric:         Mood and Affect: Mood normal.         Behavior: Behavior is cooperative.         Assessment/Plan   Problem List Items Addressed This Visit       Diabetes mellitus (Multi)     Metformin  Glimepiride  Insulin glargine  Sliding scale insulin  Glucoscan         Cerebral infarction - Primary     Monitor for changes in speech, mentation, or weakness.  Statin  Aspirin         GERD (gastroesophageal reflux disease)     Monitor GI symptoms  Famotidine         Thrush     Continue nystatin         Weakness     Work in therapy towards  goals         Pain     Continue Tylenol scheduled          Medications, treatments, and labs reviewed  Continue medications and treatments as listed in EMR      Scribe Attestation  I, Sonia Preciado   attest that this documentation has been prepared under the direction and in the presence of Latesha Nathan MD    Provider Attestation - Scribe documentation  All medical record entries made by the Scribe were at my direction and personally dictated by me. I have reviewed the chart and agree that the record accurately reflects my personal performance of the history, physical exam, discussion and plan.   Latesha Nathan MD

## 2024-11-27 ENCOUNTER — NURSING HOME VISIT (OUTPATIENT)
Dept: POST ACUTE CARE | Facility: EXTERNAL LOCATION | Age: 79
End: 2024-11-27
Payer: MEDICARE

## 2024-11-27 DIAGNOSIS — K21.9 GASTROESOPHAGEAL REFLUX DISEASE, UNSPECIFIED WHETHER ESOPHAGITIS PRESENT: ICD-10-CM

## 2024-11-27 DIAGNOSIS — N40.0 BENIGN PROSTATIC HYPERPLASIA, UNSPECIFIED WHETHER LOWER URINARY TRACT SYMPTOMS PRESENT: ICD-10-CM

## 2024-11-27 DIAGNOSIS — R53.1 WEAKNESS: Primary | ICD-10-CM

## 2024-11-27 PROCEDURE — 99308 SBSQ NF CARE LOW MDM 20: CPT | Performed by: REGISTERED NURSE

## 2024-11-27 NOTE — LETTER
Patient: Phill Baca  : 1945    Encounter Date: 2024    PROGRESS NOTE    Subjective  Chief complaint: Phill Baca is a 79 y.o. male who is an acute skilled patient being seen and evaluated for weakness    HPI:  Patient seen and examined at bedside. Patient denies n/v/f/c. Continues working in therapy. No new complaints.    Objective  Vital signs: 118/69, 98.3, 79, 17, 98%    Physical Exam  Constitutional:       General: He is not in acute distress.  Eyes:      Extraocular Movements: Extraocular movements intact.   Pulmonary:      Effort: Pulmonary effort is normal.   Musculoskeletal:      Cervical back: Neck supple.   Neurological:      Mental Status: He is alert.   Psychiatric:         Mood and Affect: Mood normal.         Behavior: Behavior is cooperative.         Assessment/Plan  Problem List Items Addressed This Visit       BPH (benign prostatic hyperplasia)     Monitor  symptoms.  Finasteride  Doxazosin            GERD (gastroesophageal reflux disease)     Monitor GI symptoms  Famotidine         Weakness - Primary     Completed therapy          Medications, treatments, and labs reviewed  Continue medications and treatments as listed in [facility]    Scribe Attestation  IBlanche Scribe   attest that this documentation has been prepared under the direction and in the presence of OSMIN Sanchez.    Provider Attestation - Scribe documentation  All medical record entries made by the Scribe were at my direction and personally dictated by me. I have reviewed the chart and agree that the record accurately reflects my personal performance of the history, physical exam, discussion and plan.    OSMIN Sanchez        Electronically Signed By: OSMIN Sanchez   24  5:47 PM

## 2024-11-29 ENCOUNTER — NURSING HOME VISIT (OUTPATIENT)
Dept: POST ACUTE CARE | Facility: EXTERNAL LOCATION | Age: 79
End: 2024-11-29
Payer: MEDICARE

## 2024-11-29 DIAGNOSIS — B37.0 THRUSH: Primary | ICD-10-CM

## 2024-11-29 DIAGNOSIS — I63.9 CEREBRAL INFARCTION, UNSPECIFIED MECHANISM (MULTI): ICD-10-CM

## 2024-11-29 DIAGNOSIS — R53.1 WEAKNESS: ICD-10-CM

## 2024-11-29 DIAGNOSIS — I10 BENIGN HYPERTENSION: ICD-10-CM

## 2024-11-29 PROCEDURE — 99308 SBSQ NF CARE LOW MDM 20: CPT | Performed by: INTERNAL MEDICINE

## 2024-11-29 NOTE — LETTER
Patient: Phill Baca  : 1945    Encounter Date: 2024    PROGRESS NOTE    Subjective  Chief complaint: Phill Baca is a 79 y.o. male who is an acute skilled patient being seen and evaluated for weakness    HPI:  HPI  An interactive audio and/or video telecommunication system which permits real time communications between the patient (at the originating site) and provider (at a distant site) was utilized to provide this telehealth service after obtaining verbal consent.  Patient is working with therapy. Therapy has been working with the patient to improve strength and endurance with ADLs, transfers, and mobility.  Patient continues to work toward goals.  Patient is stable and has no new complaints.  Nursing staff voices no new concerns today.  Patient appears to be in no acute distress.  Patient does continue on nystatin for thrush.    Objective  Vital signs: 110/62, 77, 18, 98.0, 97%    Physical Exam  Constitutional:       General: He is not in acute distress.  Eyes:      Extraocular Movements: Extraocular movements intact.   Pulmonary:      Effort: Pulmonary effort is normal.   Abdominal:      Comments: PEG   Genitourinary:     Comments: Graves  Musculoskeletal:      Cervical back: Neck supple.   Neurological:      Mental Status: He is alert.   Psychiatric:         Mood and Affect: Mood normal.         Behavior: Behavior is cooperative.         Assessment/Plan  Problem List Items Addressed This Visit       Benign hypertension     Monitor blood pressure  Lisinopril  Metoprolol         Cerebral infarction     Monitor for changes in speech, mentation, or weakness.  Statin  Aspirin         Thrush - Primary     Continue nystatin 12/3/2024         Weakness     Continue working with therapy towards goals          Medications, treatments, and labs reviewed  Continue medications and treatments as listed in EMR      Scribe Attestation  I, Sonia Preciado   attest that this documentation has  been prepared under the direction and in the presence of Latesha Nathan MD    Provider Attestation - Scribe documentation  All medical record entries made by the Scribe were at my direction and personally dictated by me. I have reviewed the chart and agree that the record accurately reflects my personal performance of the history, physical exam, discussion and plan.   Latesha Nathan MD        Electronically Signed By: Latesha Nathan MD   12/1/24  5:13 PM

## 2024-11-29 NOTE — PROGRESS NOTES
PROGRESS NOTE    Subjective   Chief complaint: Phill Baca is a 79 y.o. male who is an acute skilled patient being seen and evaluated for weakness    HPI:  HPI  An interactive audio and/or video telecommunication system which permits real time communications between the patient (at the originating site) and provider (at a distant site) was utilized to provide this telehealth service after obtaining verbal consent.  Patient is working with therapy. Therapy has been working with the patient to improve strength and endurance with ADLs, transfers, and mobility.  Patient continues to work toward goals.  Patient is stable and has no new complaints.  Nursing staff voices no new concerns today.  Patient appears to be in no acute distress.  Patient does continue on nystatin for thrush.    Objective   Vital signs: 110/62, 77, 18, 98.0, 97%    Physical Exam  Constitutional:       General: He is not in acute distress.  Eyes:      Extraocular Movements: Extraocular movements intact.   Pulmonary:      Effort: Pulmonary effort is normal.   Abdominal:      Comments: PEG   Genitourinary:     Comments: Graves  Musculoskeletal:      Cervical back: Neck supple.   Neurological:      Mental Status: He is alert.   Psychiatric:         Mood and Affect: Mood normal.         Behavior: Behavior is cooperative.         Assessment/Plan   Problem List Items Addressed This Visit       Benign hypertension     Monitor blood pressure  Lisinopril  Metoprolol         Cerebral infarction     Monitor for changes in speech, mentation, or weakness.  Statin  Aspirin         Thrush - Primary     Continue nystatin 12/3/2024         Weakness     Continue working with therapy towards goals          Medications, treatments, and labs reviewed  Continue medications and treatments as listed in EMR      Scribe Attestation  JACOB, Sonia Preciado   attest that this documentation has been prepared under the direction and in the presence of Latesha Nathan  MD    Provider Attestation - Scribe documentation  All medical record entries made by the Scribe were at my direction and personally dictated by me. I have reviewed the chart and agree that the record accurately reflects my personal performance of the history, physical exam, discussion and plan.   Latesha Nathan MD

## 2024-12-02 ENCOUNTER — NURSING HOME VISIT (OUTPATIENT)
Dept: POST ACUTE CARE | Facility: EXTERNAL LOCATION | Age: 79
End: 2024-12-02
Payer: MEDICARE

## 2024-12-02 DIAGNOSIS — N40.0 BENIGN PROSTATIC HYPERPLASIA, UNSPECIFIED WHETHER LOWER URINARY TRACT SYMPTOMS PRESENT: ICD-10-CM

## 2024-12-02 DIAGNOSIS — K21.9 GASTROESOPHAGEAL REFLUX DISEASE, UNSPECIFIED WHETHER ESOPHAGITIS PRESENT: ICD-10-CM

## 2024-12-02 DIAGNOSIS — E11.9 TYPE 2 DIABETES MELLITUS WITHOUT COMPLICATION, WITH LONG-TERM CURRENT USE OF INSULIN (MULTI): ICD-10-CM

## 2024-12-02 DIAGNOSIS — R53.1 WEAKNESS: Primary | ICD-10-CM

## 2024-12-02 DIAGNOSIS — I10 BENIGN HYPERTENSION: ICD-10-CM

## 2024-12-02 DIAGNOSIS — Z79.4 TYPE 2 DIABETES MELLITUS WITHOUT COMPLICATION, WITH LONG-TERM CURRENT USE OF INSULIN (MULTI): ICD-10-CM

## 2024-12-02 DIAGNOSIS — I63.9 CEREBRAL INFARCTION, UNSPECIFIED MECHANISM (MULTI): ICD-10-CM

## 2024-12-02 PROCEDURE — 99315 NF DSCHRG MGMT 30 MIN/LESS: CPT | Performed by: INTERNAL MEDICINE

## 2024-12-02 NOTE — LETTER
Patient: Phill Baca  : 1945    Encounter Date: 2024    PROGRESS NOTE    Subjective  Chief complaint: Phill Baca is a 79 y.o. male who is an acute skilled patient being seen and evaluated for weakness    HPI:  HPI  Patient has been in skilled nursing facility working with therapy, completed therapy course.  Patient is discharging today to [facility] living [facility].  Patient appears to be in no acute distress.  Denies chest pain, shortness of breath, nausea vomiting fever chills.  Patient has no further questions or concerns.    Objective  Vital signs: 118/69, 79, 16, 98.3, 98% blood sugar 260    Physical Exam  Constitutional:       General: He is not in acute distress.  Eyes:      Extraocular Movements: Extraocular movements intact.   Pulmonary:      Effort: Pulmonary effort is normal.   Abdominal:      Comments: PEG   Genitourinary:     Comments: Graves  Musculoskeletal:      Cervical back: Neck supple.   Neurological:      Mental Status: He is alert.   Psychiatric:         Mood and Affect: Mood normal.         Behavior: Behavior is cooperative.       ADMITTING/DISCHARGE DIAGNOSES:  Assessment/Plan  Problem List Items Addressed This Visit       Benign hypertension     Monitor blood pressure  Lisinopril  Metoprolol         Diabetes mellitus (Multi)     Metformin  Glimepiride  Insulin glargine  Sliding scale insulin  Glucoscan         Cerebral infarction     Monitor for changes in speech, mentation, or weakness.  Statin  Aspirin         BPH (benign prostatic hyperplasia)     Monitor  symptoms.  Finasteride  Doxazosin         GERD (gastroesophageal reflux disease)     Monitor GI symptoms  Famotidine         Weakness - Primary     Completed therapy        Prognosis - fair  Course - therapy  Plan - DC home with [facility]   Follow up with PCP within 2 weeks  Medications called into pharmacy by office  Condition at discharge is stable  Medications, treatments, and labs reviewed  Continue  medications and treatments as listed in EMR      Scribe Attestation  I, Sonia Preciado   attest that this documentation has been prepared under the direction and in the presence of Latesha Nathan MD    Provider Attestation - Scribe documentation  All medical record entries made by the Scribe were at my direction and personally dictated by me. I have reviewed the chart and agree that the record accurately reflects my personal performance of the history, physical exam, discussion and plan.   Latesha Nathan MD        Electronically Signed By: Latesha Nathan MD   12/3/24  1:11 PM

## 2024-12-02 NOTE — PROGRESS NOTES
PROGRESS NOTE    Subjective   Chief complaint: Phill Baca is a 79 y.o. male who is an acute skilled patient being seen and evaluated for weakness    HPI:  HPI  Patient has been in skilled nursing facility working with therapy, completed therapy course.  Patient is discharging today to [facility] assisted living facility.  Patient appears to be in no acute distress.  Denies chest pain, shortness of breath, nausea vomiting fever chills.  Patient has no further questions or concerns.    Objective   Vital signs: 118/69, 79, 16, 98.3, 98% blood sugar 260    Physical Exam  Constitutional:       General: He is not in acute distress.  Eyes:      Extraocular Movements: Extraocular movements intact.   Pulmonary:      Effort: Pulmonary effort is normal.   Abdominal:      Comments: PEG   Genitourinary:     Comments: Graves  Musculoskeletal:      Cervical back: Neck supple.   Neurological:      Mental Status: He is alert.   Psychiatric:         Mood and Affect: Mood normal.         Behavior: Behavior is cooperative.       ADMITTING/DISCHARGE DIAGNOSES:  Assessment/Plan   Problem List Items Addressed This Visit       Benign hypertension     Monitor blood pressure  Lisinopril  Metoprolol         Diabetes mellitus (Multi)     Metformin  Glimepiride  Insulin glargine  Sliding scale insulin  Glucoscan         Cerebral infarction     Monitor for changes in speech, mentation, or weakness.  Statin  Aspirin         BPH (benign prostatic hyperplasia)     Monitor  symptoms.  Finasteride  Doxazosin         GERD (gastroesophageal reflux disease)     Monitor GI symptoms  Famotidine         Weakness - Primary     Completed therapy        Prognosis - fair  Course - therapy  Plan - [place] home with [facility]   Follow up with PCP within 2 weeks  Medications called into pharmacy by office  Condition at discharge is stable  Medications, treatments, and labs reviewed  Continue medications and treatments as listed in EMR      Scribe Attestation  I,  Yu Preciadoibe   attest that this documentation has been prepared under the direction and in the presence of Latesha Nathan MD    Provider Attestation - Scribe documentation  All medical record entries made by the Scribe were at my direction and personally dictated by me. I have reviewed the chart and agree that the record accurately reflects my personal performance of the history, physical exam, discussion and plan.   Latesha Nathan MD

## 2024-12-03 NOTE — PROGRESS NOTES
PROGRESS NOTE    Subjective   Chief complaint: Phill Baca is a 79 y.o. male who is an acute skilled patient being seen and evaluated for weakness    HPI:  Patient seen and examined at bedside. Patient denies n/v/f/c. Continues working in therapy. No new complaints.    Objective   Vital signs: 118/69, 98.3, 79, 17, 98%    Physical Exam  Constitutional:       General: He is not in acute distress.  Eyes:      Extraocular Movements: Extraocular movements intact.   Pulmonary:      Effort: Pulmonary effort is normal.   Musculoskeletal:      Cervical back: Neck supple.   Neurological:      Mental Status: He is alert.   Psychiatric:         Mood and Affect: Mood normal.         Behavior: Behavior is cooperative.         Assessment/Plan   Problem List Items Addressed This Visit       BPH (benign prostatic hyperplasia)     Monitor  symptoms.  Finasteride  Doxazosin            GERD (gastroesophageal reflux disease)     Monitor GI symptoms  Famotidine         Weakness - Primary     Completed therapy          Medications, treatments, and labs reviewed  Continue medications and treatments as listed in [facility]    Scribe Attestation  Blanche JAMES Scribe   attest that this documentation has been prepared under the direction and in the presence of OSMIN Sanchez.    Provider Attestation - Scribe documentation  All medical record entries made by the Scribe were at my direction and personally dictated by me. I have reviewed the chart and agree that the record accurately reflects my personal performance of the history, physical exam, discussion and plan.    OSMIN Sanchez